# Patient Record
Sex: FEMALE | URBAN - METROPOLITAN AREA
[De-identification: names, ages, dates, MRNs, and addresses within clinical notes are randomized per-mention and may not be internally consistent; named-entity substitution may affect disease eponyms.]

---

## 2021-09-17 ENCOUNTER — OFFICE VISIT (OUTPATIENT)
Dept: URBAN - METROPOLITAN AREA CLINIC 6 | Facility: CLINIC | Age: 36
End: 2021-09-17
Payer: MEDICAID

## 2021-09-17 DIAGNOSIS — H53.143 VISUAL DISCOMFORT, BILATERAL: ICD-10-CM

## 2021-09-17 DIAGNOSIS — H47.323 DRUSEN OF OPTIC DISC, BILATERAL: Primary | ICD-10-CM

## 2021-09-17 DIAGNOSIS — H40.053 OCULAR HYPERTENSION, BILATERAL: ICD-10-CM

## 2021-09-17 PROCEDURE — 92250 FUNDUS PHOTOGRAPHY W/I&R: CPT | Performed by: OPTOMETRIST

## 2021-09-17 PROCEDURE — 92133 CPTRZD OPH DX IMG PST SGM ON: CPT | Performed by: OPTOMETRIST

## 2021-09-17 PROCEDURE — 92004 COMPRE OPH EXAM NEW PT 1/>: CPT | Performed by: OPTOMETRIST

## 2021-09-17 ASSESSMENT — INTRAOCULAR PRESSURE
OD: 24
OS: 23

## 2021-09-17 NOTE — IMPRESSION/PLAN
Impression: Drusen of optic disc, bilateral: H47.323. Plan: Baseline fundus autofluorescence photos today. Monitor.

## 2021-09-17 NOTE — IMPRESSION/PLAN
Impression: Ocular hypertension, bilateral: H40.053. Plan: C/D .1 OU. Pt has thicker than average pachymetry and RNFL OD has always appeared thin in the right eye. Plan on glaucoma testing next visit.

## 2021-09-17 NOTE — IMPRESSION/PLAN
Impression: Visual discomfort, bilateral: H53.143. Plan: Recommend spectacle Rx as patient has significant hyperopia OD and astigmatism OS.

## 2023-02-23 ENCOUNTER — TESTING ONLY (OUTPATIENT)
Dept: URBAN - METROPOLITAN AREA CLINIC 6 | Facility: CLINIC | Age: 38
End: 2023-02-23
Payer: MEDICAID

## 2023-02-23 DIAGNOSIS — H25.11 AGE-RELATED NUCLEAR CATARACT, RIGHT EYE: Primary | ICD-10-CM

## 2023-02-23 RX ORDER — PREDNISOLONE ACETATE 10 MG/ML
1 % SUSPENSION/ DROPS OPHTHALMIC
Qty: 10 | Refills: 1 | Status: ACTIVE
Start: 2023-02-23

## 2023-02-24 ENCOUNTER — POST-OPERATIVE VISIT (OUTPATIENT)
Dept: URBAN - METROPOLITAN AREA CLINIC 6 | Facility: CLINIC | Age: 38
End: 2023-02-24
Payer: MEDICAID

## 2023-02-24 DIAGNOSIS — Z48.810 ENCOUNTER FOR SURGICAL AFTERCARE FOLLOWING SURGERY ON A SENSE ORGAN: Primary | ICD-10-CM

## 2023-02-24 PROCEDURE — 99024 POSTOP FOLLOW-UP VISIT: CPT | Performed by: OPTOMETRIST

## 2023-02-24 ASSESSMENT — INTRAOCULAR PRESSURE: OD: 19

## 2023-03-27 ENCOUNTER — POST-OPERATIVE VISIT (OUTPATIENT)
Dept: URBAN - METROPOLITAN AREA CLINIC 6 | Facility: CLINIC | Age: 38
End: 2023-03-27
Payer: MEDICAID

## 2023-03-27 DIAGNOSIS — Z48.810 ENCOUNTER FOR SURGICAL AFTERCARE FOLLOWING SURGERY ON A SENSE ORGAN: Primary | ICD-10-CM

## 2023-03-27 PROCEDURE — 99024 POSTOP FOLLOW-UP VISIT: CPT | Performed by: OPTOMETRIST

## 2023-03-27 ASSESSMENT — INTRAOCULAR PRESSURE: OD: 19

## 2023-03-27 NOTE — IMPRESSION/PLAN
Impression: S/P Cataract Extraction by phacoemulsification with IOL placement OD - 31 Days. Encounter for surgical aftercare following surgery on a sense organ  Z48.810. DVAcc 25 OD, 20/40 OS. Lengthy discussed of why pt needs gls after CE. MOCT flat OU. Plan: S/P Cataract extraction with placement of moxifloxacin intracamerally  - Start taper for Prednisolone TID x 2 weeks, BID x 2 weeks, QD x 2 weeks, then stop OD (shake well before use). Patient advised to call immediately for any problems including, but not limited to, pain, redness, increase in floaters, flashing lights, changes in peripheral vision, decreased vision, and/or any other problems or concerns. Patient stated an understanding of all the instructions. -Advised patient to use artificial tears for comfort. RTC in 2 months for a po.